# Patient Record
Sex: MALE | Race: WHITE | Employment: UNEMPLOYED | URBAN - METROPOLITAN AREA
[De-identification: names, ages, dates, MRNs, and addresses within clinical notes are randomized per-mention and may not be internally consistent; named-entity substitution may affect disease eponyms.]

---

## 2017-09-07 ENCOUNTER — TELEPHONE (OUTPATIENT)
Dept: OTHER | Age: 52
End: 2017-09-07

## 2017-09-07 NOTE — TELEPHONE ENCOUNTER
Pt requesting a refill of hemorrhoidal suppository once prescribed prior to [facility].   As has a hemorrhoid aggravating him    appt pending 9/18/17

## 2017-09-18 ENCOUNTER — OFFICE VISIT (OUTPATIENT)
Dept: FAMILY MEDICINE CLINIC | Facility: CLINIC | Age: 52
End: 2017-09-18

## 2017-09-18 VITALS
HEART RATE: 81 BPM | DIASTOLIC BLOOD PRESSURE: 92 MMHG | TEMPERATURE: 98 F | BODY MASS INDEX: 25.31 KG/M2 | HEIGHT: 68 IN | WEIGHT: 167 LBS | SYSTOLIC BLOOD PRESSURE: 161 MMHG

## 2017-09-18 DIAGNOSIS — D86.9 SARCOIDOSIS: ICD-10-CM

## 2017-09-18 DIAGNOSIS — Z00.00 ROUTINE GENERAL MEDICAL EXAMINATION AT A HEALTH CARE FACILITY: Primary | ICD-10-CM

## 2017-09-18 DIAGNOSIS — I10 ESSENTIAL HYPERTENSION: ICD-10-CM

## 2017-09-18 PROCEDURE — 99396 PREV VISIT EST AGE 40-64: CPT | Performed by: FAMILY MEDICINE

## 2017-09-18 RX ORDER — BENAZEPRIL HYDROCHLORIDE 10 MG/1
TABLET ORAL
Qty: 90 TABLET | Refills: 3 | Status: SHIPPED | OUTPATIENT
Start: 2017-09-18 | End: 2018-09-25

## 2017-09-18 NOTE — PROGRESS NOTES
HPI:    Patient ID: Yuni Guzmán is a 46year old male. HPI  Patient presents with:  Physical  Medication Request    Review of Systems   Constitutional: Negative. HENT: Negative. Eyes: Negative. Respiratory: Negative.     Cardiovascular: Ne sounds. Pulses:       Carotid pulses are 2+ on the right side, and 2+ on the left side. Radial pulses are 2+ on the right side, and 2+ on the left side. Pulmonary/Chest: Breath sounds normal.   Abdominal: Normal appearance.  There is no hepatospl

## 2017-09-22 ENCOUNTER — LAB ENCOUNTER (OUTPATIENT)
Dept: LAB | Age: 52
End: 2017-09-22
Attending: FAMILY MEDICINE
Payer: COMMERCIAL

## 2017-09-22 DIAGNOSIS — Z00.00 ROUTINE GENERAL MEDICAL EXAMINATION AT A HEALTH CARE FACILITY: ICD-10-CM

## 2017-09-22 LAB
ALBUMIN SERPL BCP-MCNC: 4.3 G/DL (ref 3.5–4.8)
ALBUMIN/GLOB SERPL: 1.6 {RATIO} (ref 1–2)
ALP SERPL-CCNC: 48 U/L (ref 32–100)
ALT SERPL-CCNC: 21 U/L (ref 17–63)
ANION GAP SERPL CALC-SCNC: 7 MMOL/L (ref 0–18)
AST SERPL-CCNC: 21 U/L (ref 15–41)
BASOPHILS # BLD: 0 K/UL (ref 0–0.2)
BASOPHILS NFR BLD: 1 %
BILIRUB SERPL-MCNC: 0.8 MG/DL (ref 0.3–1.2)
BILIRUB UR QL: NEGATIVE
BUN SERPL-MCNC: 11 MG/DL (ref 8–20)
BUN/CREAT SERPL: 12.6 (ref 10–20)
CALCIUM SERPL-MCNC: 9 MG/DL (ref 8.5–10.5)
CHLORIDE SERPL-SCNC: 101 MMOL/L (ref 95–110)
CHOLEST SERPL-MCNC: 184 MG/DL (ref 110–200)
CLARITY UR: CLEAR
CO2 SERPL-SCNC: 29 MMOL/L (ref 22–32)
COLOR UR: YELLOW
CREAT SERPL-MCNC: 0.87 MG/DL (ref 0.5–1.5)
EOSINOPHIL # BLD: 0.1 K/UL (ref 0–0.7)
EOSINOPHIL NFR BLD: 2 %
ERYTHROCYTE [DISTWIDTH] IN BLOOD BY AUTOMATED COUNT: 12.8 % (ref 11–15)
GLOBULIN PLAS-MCNC: 2.7 G/DL (ref 2.5–3.7)
GLUCOSE SERPL-MCNC: 91 MG/DL (ref 70–99)
GLUCOSE UR-MCNC: NEGATIVE MG/DL
HCT VFR BLD AUTO: 40 % (ref 41–52)
HDLC SERPL-MCNC: 70 MG/DL
HGB BLD-MCNC: 14 G/DL (ref 13.5–17.5)
HGB UR QL STRIP.AUTO: NEGATIVE
KETONES UR-MCNC: NEGATIVE MG/DL
LDLC SERPL CALC-MCNC: 104 MG/DL (ref 0–99)
LEUKOCYTE ESTERASE UR QL STRIP.AUTO: NEGATIVE
LYMPHOCYTES # BLD: 0.6 K/UL (ref 1–4)
LYMPHOCYTES NFR BLD: 15 %
MCH RBC QN AUTO: 30.2 PG (ref 27–32)
MCHC RBC AUTO-ENTMCNC: 35 G/DL (ref 32–37)
MCV RBC AUTO: 86.3 FL (ref 80–100)
MONOCYTES # BLD: 0.3 K/UL (ref 0–1)
MONOCYTES NFR BLD: 8 %
NEUTROPHILS # BLD AUTO: 3 K/UL (ref 1.8–7.7)
NEUTROPHILS NFR BLD: 74 %
NITRITE UR QL STRIP.AUTO: NEGATIVE
NONHDLC SERPL-MCNC: 114 MG/DL
OSMOLALITY UR CALC.SUM OF ELEC: 283 MOSM/KG (ref 275–295)
PH UR: 7 [PH] (ref 5–8)
PLATELET # BLD AUTO: 151 K/UL (ref 140–400)
PMV BLD AUTO: 8.3 FL (ref 7.4–10.3)
POTASSIUM SERPL-SCNC: 3.5 MMOL/L (ref 3.3–5.1)
PROT SERPL-MCNC: 7 G/DL (ref 5.9–8.4)
PROT UR-MCNC: NEGATIVE MG/DL
PSA SERPL-MCNC: 0.4 NG/ML (ref 0–4)
RBC # BLD AUTO: 4.64 M/UL (ref 4.5–5.9)
SODIUM SERPL-SCNC: 137 MMOL/L (ref 136–144)
SP GR UR STRIP: 1.01 (ref 1–1.03)
TRIGL SERPL-MCNC: 49 MG/DL (ref 1–149)
UROBILINOGEN UR STRIP-ACNC: <2
VIT C UR-MCNC: NEGATIVE MG/DL
WBC # BLD AUTO: 4 K/UL (ref 4–11)

## 2017-09-22 PROCEDURE — 80053 COMPREHEN METABOLIC PANEL: CPT

## 2017-09-22 PROCEDURE — 81003 URINALYSIS AUTO W/O SCOPE: CPT

## 2017-09-22 PROCEDURE — 36415 COLL VENOUS BLD VENIPUNCTURE: CPT

## 2017-09-22 PROCEDURE — 80061 LIPID PANEL: CPT

## 2017-09-22 PROCEDURE — 85025 COMPLETE CBC W/AUTO DIFF WBC: CPT

## 2018-09-25 ENCOUNTER — OFFICE VISIT (OUTPATIENT)
Dept: FAMILY MEDICINE CLINIC | Facility: CLINIC | Age: 53
End: 2018-09-25
Payer: COMMERCIAL

## 2018-09-25 VITALS
HEART RATE: 81 BPM | DIASTOLIC BLOOD PRESSURE: 98 MMHG | BODY MASS INDEX: 24.86 KG/M2 | SYSTOLIC BLOOD PRESSURE: 148 MMHG | TEMPERATURE: 99 F | WEIGHT: 164 LBS | HEIGHT: 68 IN | RESPIRATION RATE: 20 BRPM

## 2018-09-25 DIAGNOSIS — I10 ESSENTIAL HYPERTENSION: ICD-10-CM

## 2018-09-25 DIAGNOSIS — Z00.00 ROUTINE PHYSICAL EXAMINATION: ICD-10-CM

## 2018-09-25 DIAGNOSIS — Z12.11 COLON CANCER SCREENING: Primary | ICD-10-CM

## 2018-09-25 PROCEDURE — 99396 PREV VISIT EST AGE 40-64: CPT | Performed by: FAMILY MEDICINE

## 2018-09-25 RX ORDER — BENAZEPRIL HYDROCHLORIDE 10 MG/1
TABLET ORAL
Qty: 90 TABLET | Refills: 3 | Status: SHIPPED | OUTPATIENT
Start: 2018-09-25 | End: 2019-01-03

## 2018-09-25 NOTE — PROGRESS NOTES
HPI:    Patient ID: Trang Mary is a 45year old male. Patient is here for routine annual physical exam. No acute issues. No significant chronic medical problems. Patient is requesting testing. Diet and exercise have been fair.  Past medical histor wheezes. He has no rales. Abdominal: Soft. He exhibits no distension. There is no tenderness. There is no rebound. Musculoskeletal: Normal range of motion. He exhibits no edema, tenderness or deformity.    Lymphadenopathy:     He has no cervical adenopa

## 2018-09-28 ENCOUNTER — APPOINTMENT (OUTPATIENT)
Dept: LAB | Age: 53
End: 2018-09-28
Attending: FAMILY MEDICINE
Payer: COMMERCIAL

## 2018-09-28 DIAGNOSIS — I10 ESSENTIAL HYPERTENSION: ICD-10-CM

## 2018-09-28 DIAGNOSIS — Z00.00 ROUTINE PHYSICAL EXAMINATION: ICD-10-CM

## 2018-09-28 PROCEDURE — 36415 COLL VENOUS BLD VENIPUNCTURE: CPT

## 2018-09-28 PROCEDURE — 80053 COMPREHEN METABOLIC PANEL: CPT

## 2018-09-28 PROCEDURE — 80061 LIPID PANEL: CPT

## 2018-09-28 PROCEDURE — 85027 COMPLETE CBC AUTOMATED: CPT

## 2019-01-03 RX ORDER — BENAZEPRIL HYDROCHLORIDE 10 MG/1
TABLET ORAL
Qty: 90 TABLET | Refills: 0 | Status: SHIPPED | OUTPATIENT
Start: 2019-01-03 | End: 2019-01-11

## 2019-01-03 NOTE — TELEPHONE ENCOUNTER
Per p;t, he has a new insurance updated in his chart and so need his rx med send to new pharmacy on file verified.    Benazepril HCl 10 MG Oral Tab      Current Outpatient Medications:  Benazepril HCl 10 MG Oral Tab One tablet daily Disp: 90 tablet Rfl: 3

## 2019-01-11 ENCOUNTER — PATIENT MESSAGE (OUTPATIENT)
Dept: FAMILY MEDICINE CLINIC | Facility: CLINIC | Age: 54
End: 2019-01-11

## 2019-01-11 RX ORDER — BENAZEPRIL HYDROCHLORIDE 10 MG/1
TABLET ORAL
Qty: 90 TABLET | Refills: 3 | Status: SHIPPED | OUTPATIENT
Start: 2019-01-11 | End: 2019-10-25

## 2019-01-11 NOTE — TELEPHONE ENCOUNTER
From: Sue Pappas  To: Melo Barron MD  Sent: 1/11/2019 10:27 AM CST  Subject: Prescription Question    At the start of 2019 our insurance plan changed so I called to have the remaining 3 refills sent to [facility] versus the previous mail order [facility]

## 2019-01-11 NOTE — TELEPHONE ENCOUNTER
Pt has active med order. When location change was requested only 90 day supply sent not the original order.   Rx resent

## 2019-10-25 ENCOUNTER — OFFICE VISIT (OUTPATIENT)
Dept: FAMILY MEDICINE CLINIC | Facility: CLINIC | Age: 54
End: 2019-10-25
Payer: COMMERCIAL

## 2019-10-25 VITALS
SYSTOLIC BLOOD PRESSURE: 160 MMHG | DIASTOLIC BLOOD PRESSURE: 81 MMHG | WEIGHT: 166 LBS | HEIGHT: 68 IN | BODY MASS INDEX: 25.16 KG/M2 | HEART RATE: 91 BPM | TEMPERATURE: 98 F

## 2019-10-25 DIAGNOSIS — M25.561 CHRONIC PAIN OF BOTH KNEES: ICD-10-CM

## 2019-10-25 DIAGNOSIS — M25.562 CHRONIC PAIN OF BOTH KNEES: ICD-10-CM

## 2019-10-25 DIAGNOSIS — G89.29 CHRONIC PAIN OF BOTH KNEES: ICD-10-CM

## 2019-10-25 DIAGNOSIS — D86.9 SARCOIDOSIS: ICD-10-CM

## 2019-10-25 DIAGNOSIS — I10 ESSENTIAL HYPERTENSION: ICD-10-CM

## 2019-10-25 DIAGNOSIS — Z00.00 ROUTINE PHYSICAL EXAMINATION: Primary | ICD-10-CM

## 2019-10-25 PROCEDURE — 99396 PREV VISIT EST AGE 40-64: CPT | Performed by: FAMILY MEDICINE

## 2019-10-25 RX ORDER — BENAZEPRIL HYDROCHLORIDE 10 MG/1
TABLET ORAL
Qty: 90 TABLET | Refills: 3 | Status: SHIPPED | OUTPATIENT
Start: 2019-10-25 | End: 2019-11-04 | Stop reason: CLARIF

## 2019-10-25 NOTE — PROGRESS NOTES
HPI:    Patient ID: Phil Skinner is a 52year old male.     HPI  Patient presents with:  Physical: annual physical, PT DECLINED FLU SHOT     Past Medical History:   Diagnosis Date   • Cholecystitis    • Umbilical pain     adhesions   • Unspecified esse the right side and 2+ on the left side. Radial pulses are 2+ on the right side and 2+ on the left side. Pulmonary/Chest: Breath sounds normal.   Abdominal: Normal appearance. There is no hepatosplenomegaly. There is no tenderness.  There is no CVA t

## 2019-11-01 ENCOUNTER — LAB ENCOUNTER (OUTPATIENT)
Dept: LAB | Age: 54
End: 2019-11-01
Attending: FAMILY MEDICINE
Payer: COMMERCIAL

## 2019-11-01 DIAGNOSIS — Z00.00 ROUTINE PHYSICAL EXAMINATION: ICD-10-CM

## 2019-11-01 PROCEDURE — 85025 COMPLETE CBC W/AUTO DIFF WBC: CPT

## 2019-11-01 PROCEDURE — 36415 COLL VENOUS BLD VENIPUNCTURE: CPT

## 2019-11-01 PROCEDURE — 80061 LIPID PANEL: CPT

## 2019-11-01 PROCEDURE — 80053 COMPREHEN METABOLIC PANEL: CPT

## 2019-11-01 PROCEDURE — 81003 URINALYSIS AUTO W/O SCOPE: CPT

## 2019-11-04 ENCOUNTER — TELEPHONE (OUTPATIENT)
Dept: FAMILY MEDICINE CLINIC | Facility: CLINIC | Age: 54
End: 2019-11-04

## 2019-11-04 RX ORDER — BENAZEPRIL HYDROCHLORIDE 10 MG/1
TABLET ORAL
Qty: 90 TABLET | Refills: 3 | Status: SHIPPED | OUTPATIENT
Start: 2019-11-04 | End: 2020-10-26

## 2019-11-04 NOTE — TELEPHONE ENCOUNTER
Patient states that medication below was sent to the wrong pharmacy. He no longer uses [facility] due to insurance reasons. He is requesting the script be sent to [facility] in [place], on chart. Please advise.      Current Outpatient Medi

## 2019-11-04 NOTE — TELEPHONE ENCOUNTER
Per patient request, contacted [facility] by phone to confirm cancellation of Benazepril. Script re-sent to [facility]. Pt notified of transfer via [address]

## 2020-10-26 ENCOUNTER — OFFICE VISIT (OUTPATIENT)
Dept: FAMILY MEDICINE CLINIC | Facility: CLINIC | Age: 55
End: 2020-10-26
Payer: COMMERCIAL

## 2020-10-26 VITALS
HEART RATE: 81 BPM | WEIGHT: 165 LBS | BODY MASS INDEX: 25.01 KG/M2 | HEIGHT: 68 IN | SYSTOLIC BLOOD PRESSURE: 172 MMHG | DIASTOLIC BLOOD PRESSURE: 90 MMHG

## 2020-10-26 DIAGNOSIS — Z00.00 ROUTINE PHYSICAL EXAMINATION: Primary | ICD-10-CM

## 2020-10-26 DIAGNOSIS — I10 ESSENTIAL HYPERTENSION: ICD-10-CM

## 2020-10-26 DIAGNOSIS — D86.9 SARCOIDOSIS: ICD-10-CM

## 2020-10-26 PROCEDURE — 3008F BODY MASS INDEX DOCD: CPT | Performed by: FAMILY MEDICINE

## 2020-10-26 PROCEDURE — 3077F SYST BP >= 140 MM HG: CPT | Performed by: FAMILY MEDICINE

## 2020-10-26 PROCEDURE — 99396 PREV VISIT EST AGE 40-64: CPT | Performed by: FAMILY MEDICINE

## 2020-10-26 PROCEDURE — 3080F DIAST BP >= 90 MM HG: CPT | Performed by: FAMILY MEDICINE

## 2020-10-26 RX ORDER — BENAZEPRIL HYDROCHLORIDE 10 MG/1
TABLET ORAL
Qty: 90 TABLET | Refills: 3 | Status: SHIPPED | OUTPATIENT
Start: 2020-10-26 | End: 2020-12-10

## 2020-10-26 NOTE — PROGRESS NOTES
HPI:    Patient ID: Regina Lacey is a 62year old male.     HPI  Patient presents with:  Physical: annual physical, pt declined flu shot   Referral: for colonoscopy     Past Medical History:   Diagnosis Date   • Cholecystitis    • Umbilical pain     ad normal heart sounds. Pulses:       Carotid pulses are 2+ on the right side and 2+ on the left side. Radial pulses are 2+ on the right side and 2+ on the left side. Pulmonary/Chest: Breath sounds normal.   Abdominal: Normal appearance.  There is no

## 2020-10-27 ENCOUNTER — LAB ENCOUNTER (OUTPATIENT)
Dept: LAB | Age: 55
End: 2020-10-27
Attending: FAMILY MEDICINE
Payer: COMMERCIAL

## 2020-10-27 DIAGNOSIS — Z00.00 ROUTINE PHYSICAL EXAMINATION: ICD-10-CM

## 2020-10-27 PROCEDURE — 85025 COMPLETE CBC W/AUTO DIFF WBC: CPT

## 2020-10-27 PROCEDURE — 80053 COMPREHEN METABOLIC PANEL: CPT

## 2020-10-27 PROCEDURE — 36415 COLL VENOUS BLD VENIPUNCTURE: CPT

## 2020-10-27 PROCEDURE — 80061 LIPID PANEL: CPT

## 2020-10-27 PROCEDURE — 81003 URINALYSIS AUTO W/O SCOPE: CPT

## 2020-12-10 ENCOUNTER — TELEPHONE (OUTPATIENT)
Dept: FAMILY MEDICINE CLINIC | Facility: CLINIC | Age: 55
End: 2020-12-10

## 2020-12-10 RX ORDER — BENAZEPRIL HYDROCHLORIDE 10 MG/1
TABLET ORAL
Qty: 90 TABLET | Refills: 3 | Status: SHIPPED | OUTPATIENT
Start: 2020-12-10 | End: 2021-12-22

## 2020-12-10 NOTE — TELEPHONE ENCOUNTER
Patient states he would like prescription Benazepril  transferred to [facility] pharmacy in [facility] on [facility]. Prescription sent to [facility] in [facility] called to cancel prescription.

## 2021-12-22 RX ORDER — BENAZEPRIL HYDROCHLORIDE 10 MG/1
TABLET ORAL
Qty: 30 TABLET | Refills: 0 | Status: SHIPPED | OUTPATIENT
Start: 2021-12-22 | End: 2022-01-13

## 2022-01-13 RX ORDER — BENAZEPRIL HYDROCHLORIDE 10 MG/1
TABLET ORAL
Qty: 30 TABLET | Refills: 0 | Status: SHIPPED | OUTPATIENT
Start: 2022-01-13

## 2022-01-13 NOTE — TELEPHONE ENCOUNTER
Please review. Protocol failed or has no protocol.     Requested Prescriptions   Pending Prescriptions Disp Refills    BENAZEPRIL HCL 10 MG Oral Tab [Pharmacy Med Name: BENAZEPRIL HCL 10 MG TABLET] 30 tablet 0     Sig: TAKE 1 TABLET BY MOUTH EVERY DAY        Hypertensive Medications Protocol Failed - 1/13/2022  1:31 PM        Failed - CMP or BMP in past 12 months        Failed - Appointment in past 6 or next 3 months        Passed - GFR Non- > 50     Lab Results   Component Value Date    GFRNAA 81 10/27/2020                       Recent Outpatient Visits              1 year ago Routine physical examination    [facility], [address] [place] [address],     Office Visit    2 years ago Routine physical examination    [facility], [address] [place] [address]    Office Visit    3 years ago Colon cancer screening    Deandra Quiroz MD    Office Visit    4 years ago Routine general medical examination at a health care facility    [facility], [address] [place] [address]    5 years ago Routine general medical examination at a health care facility    [facility], [address] [place] [address]    Office Visit

## 2022-02-17 RX ORDER — BENAZEPRIL HYDROCHLORIDE 10 MG/1
TABLET ORAL
Qty: 30 TABLET | Refills: 0 | OUTPATIENT
Start: 2022-02-17

## 2022-02-17 NOTE — TELEPHONE ENCOUNTER
Patient currently in transition to move to [place]. Still traveling back and forth to [facility] so has not had a chance to schedule an appointment. has not established care in [place] yet either. Currently at the airport traveling to [facility] but his flight was cancelled because of the snow. Patient still has about 30-40pills left. Will call back to schedule an appointment when he is in [facility].

## 2023-01-05 NOTE — TELEPHONE ENCOUNTER
Left message on patient  voice mail that his Rx was approve for on 30 days supply. Pt was informed that he need to schedule appointment for his next refills.
Patient needs to schedule a visit. Last seen 10/2020. 30 day supply given.
Statement Selected

## 2023-04-11 ENCOUNTER — POST-OP (OUTPATIENT)
Dept: URBAN - METROPOLITAN AREA CLINIC 33 | Facility: CLINIC | Age: 58
End: 2023-04-11

## 2023-04-11 DIAGNOSIS — H35.412: ICD-10-CM

## 2023-04-11 DIAGNOSIS — H02.834: ICD-10-CM

## 2023-04-11 DIAGNOSIS — H33.021: ICD-10-CM

## 2023-04-11 DIAGNOSIS — H02.831: ICD-10-CM

## 2023-04-11 DIAGNOSIS — Z98.890: ICD-10-CM

## 2023-04-11 DIAGNOSIS — H43.392: ICD-10-CM

## 2023-04-11 PROCEDURE — 99024 POSTOP FOLLOW-UP VISIT: CPT

## 2023-04-11 ASSESSMENT — TONOMETRY
OS_IOP_MMHG: 14
OD_IOP_MMHG: 16

## 2023-04-11 ASSESSMENT — VISUAL ACUITY
OD_PH: 20/200-1
OS_CC: 20/40-2
OD_SC: 20/400-1

## 2023-05-15 ENCOUNTER — PRE-OP/H&P (OUTPATIENT)
Dept: URBAN - METROPOLITAN AREA CLINIC 33 | Facility: CLINIC | Age: 58
End: 2023-05-15

## 2023-05-15 DIAGNOSIS — H02.831: ICD-10-CM

## 2023-05-15 DIAGNOSIS — H43.392: ICD-10-CM

## 2023-05-15 DIAGNOSIS — H33.021: ICD-10-CM

## 2023-05-15 DIAGNOSIS — H35.412: ICD-10-CM

## 2023-05-15 DIAGNOSIS — H02.834: ICD-10-CM

## 2023-05-15 PROCEDURE — 92250 FUNDUS PHOTOGRAPHY W/I&R: CPT

## 2023-05-15 PROCEDURE — 92014 COMPRE OPH EXAM EST PT 1/>: CPT

## 2023-05-15 RX ORDER — PREDNISOLONE ACETATE 10 MG/ML: 1 SUSPENSION/ DROPS OPHTHALMIC

## 2023-05-15 RX ORDER — TOBRAMYCIN 3 MG/ML: 1 SOLUTION/ DROPS OPHTHALMIC

## 2023-05-15 ASSESSMENT — VISUAL ACUITY
OS_CC: 20/40+2
OD_SC: CF 6FT

## 2023-05-15 ASSESSMENT — TONOMETRY
OD_IOP_MMHG: 10
OS_IOP_MMHG: 14

## 2023-06-05 ENCOUNTER — SURGERY/PROCEDURE (OUTPATIENT)
Facility: LOCATION | Age: 58
End: 2023-06-05

## 2023-06-05 DIAGNOSIS — H33.021: ICD-10-CM

## 2023-06-05 PROCEDURE — 67039 LASER TREATMENT OF RETINA: CPT

## 2023-06-06 ENCOUNTER — POST-OP (OUTPATIENT)
Dept: URBAN - METROPOLITAN AREA CLINIC 33 | Facility: CLINIC | Age: 58
End: 2023-06-06

## 2023-06-06 DIAGNOSIS — H33.021: ICD-10-CM

## 2023-06-06 DIAGNOSIS — H02.831: ICD-10-CM

## 2023-06-06 DIAGNOSIS — H35.412: ICD-10-CM

## 2023-06-06 DIAGNOSIS — H02.834: ICD-10-CM

## 2023-06-06 DIAGNOSIS — H43.392: ICD-10-CM

## 2023-06-06 PROCEDURE — 99024 POSTOP FOLLOW-UP VISIT: CPT

## 2023-06-06 ASSESSMENT — TONOMETRY: OD_IOP_MMHG: 7

## 2023-06-20 ENCOUNTER — POST-OP (OUTPATIENT)
Dept: URBAN - METROPOLITAN AREA CLINIC 33 | Facility: CLINIC | Age: 58
End: 2023-06-20

## 2023-06-20 DIAGNOSIS — H35.412: ICD-10-CM

## 2023-06-20 DIAGNOSIS — H25.041: ICD-10-CM

## 2023-06-20 DIAGNOSIS — H02.834: ICD-10-CM

## 2023-06-20 DIAGNOSIS — H43.392: ICD-10-CM

## 2023-06-20 DIAGNOSIS — H02.831: ICD-10-CM

## 2023-06-20 DIAGNOSIS — H33.021: ICD-10-CM

## 2023-06-20 PROCEDURE — 99024 POSTOP FOLLOW-UP VISIT: CPT

## 2023-06-20 ASSESSMENT — TONOMETRY
OS_IOP_MMHG: 18
OD_IOP_MMHG: 18

## 2023-06-20 ASSESSMENT — VISUAL ACUITY
OD_SC: 20/400
OS_CC: 20/30-2

## 2023-07-11 ENCOUNTER — POST-OP (OUTPATIENT)
Dept: URBAN - METROPOLITAN AREA CLINIC 33 | Facility: CLINIC | Age: 58
End: 2023-07-11

## 2023-07-11 DIAGNOSIS — H02.834: ICD-10-CM

## 2023-07-11 DIAGNOSIS — H02.831: ICD-10-CM

## 2023-07-11 DIAGNOSIS — H35.412: ICD-10-CM

## 2023-07-11 DIAGNOSIS — H33.021: ICD-10-CM

## 2023-07-11 DIAGNOSIS — H25.041: ICD-10-CM

## 2023-07-11 DIAGNOSIS — H43.392: ICD-10-CM

## 2023-07-11 PROCEDURE — 99024 POSTOP FOLLOW-UP VISIT: CPT

## 2023-07-11 ASSESSMENT — TONOMETRY: OD_IOP_MMHG: 17

## 2023-07-11 ASSESSMENT — VISUAL ACUITY: OD_CC: 20/400

## 2023-08-22 ENCOUNTER — POST-OP (OUTPATIENT)
Dept: URBAN - METROPOLITAN AREA CLINIC 33 | Facility: CLINIC | Age: 58
End: 2023-08-22

## 2023-08-22 DIAGNOSIS — H02.834: ICD-10-CM

## 2023-08-22 DIAGNOSIS — H33.021: ICD-10-CM

## 2023-08-22 DIAGNOSIS — H02.831: ICD-10-CM

## 2023-08-22 DIAGNOSIS — H26.491: ICD-10-CM

## 2023-08-22 DIAGNOSIS — H43.392: ICD-10-CM

## 2023-08-22 DIAGNOSIS — H35.412: ICD-10-CM

## 2023-08-22 PROCEDURE — 99024 POSTOP FOLLOW-UP VISIT: CPT

## 2023-08-22 PROCEDURE — 92134 CPTRZ OPH DX IMG PST SGM RTA: CPT

## 2023-08-22 ASSESSMENT — VISUAL ACUITY
OD_SC: 20/80-2
OS_SC: 20/20

## 2023-08-22 ASSESSMENT — TONOMETRY
OD_IOP_MMHG: 14
OS_IOP_MMHG: 16

## 2023-10-11 ENCOUNTER — APPOINTMENT (RX ONLY)
Dept: URBAN - METROPOLITAN AREA CLINIC 124 | Facility: CLINIC | Age: 58
Setting detail: DERMATOLOGY
End: 2023-10-11

## 2023-10-11 DIAGNOSIS — D22 MELANOCYTIC NEVI: ICD-10-CM

## 2023-10-11 DIAGNOSIS — L82.1 OTHER SEBORRHEIC KERATOSIS: ICD-10-CM

## 2023-10-11 DIAGNOSIS — D18.0 HEMANGIOMA: ICD-10-CM

## 2023-10-11 DIAGNOSIS — Z71.89 OTHER SPECIFIED COUNSELING: ICD-10-CM

## 2023-10-11 DIAGNOSIS — L81.4 OTHER MELANIN HYPERPIGMENTATION: ICD-10-CM

## 2023-10-11 PROBLEM — D22.5 MELANOCYTIC NEVI OF TRUNK: Status: ACTIVE | Noted: 2023-10-11

## 2023-10-11 PROBLEM — D18.01 HEMANGIOMA OF SKIN AND SUBCUTANEOUS TISSUE: Status: ACTIVE | Noted: 2023-10-11

## 2023-10-11 PROCEDURE — ? ADDITIONAL NOTES

## 2023-10-11 PROCEDURE — ? COUNSELING

## 2023-10-11 PROCEDURE — 99203 OFFICE O/P NEW LOW 30 MIN: CPT

## 2023-10-11 ASSESSMENT — LOCATION SIMPLE DESCRIPTION DERM
LOCATION SIMPLE: LEFT UPPER BACK
LOCATION SIMPLE: RIGHT THIGH
LOCATION SIMPLE: CHEST
LOCATION SIMPLE: RIGHT LOWER BACK
LOCATION SIMPLE: ABDOMEN
LOCATION SIMPLE: RIGHT CHEEK

## 2023-10-11 ASSESSMENT — LOCATION DETAILED DESCRIPTION DERM
LOCATION DETAILED: MIDDLE STERNUM
LOCATION DETAILED: EPIGASTRIC SKIN
LOCATION DETAILED: RIGHT INFERIOR LATERAL MIDBACK
LOCATION DETAILED: RIGHT SUPERIOR CENTRAL MALAR CHEEK
LOCATION DETAILED: RIGHT ANTERIOR PROXIMAL THIGH
LOCATION DETAILED: LEFT INFERIOR UPPER BACK

## 2023-10-11 ASSESSMENT — LOCATION ZONE DERM
LOCATION ZONE: LEG
LOCATION ZONE: FACE
LOCATION ZONE: TRUNK

## 2023-10-11 NOTE — PROCEDURE: ADDITIONAL NOTES
Additional Notes: Discussed removing SK on right cheek under eye -cosmetic removal. Quoted $150 for up to 15 lesions, $50 for one. Patient verbalized understanding.
Render Risk Assessment In Note?: no
Detail Level: Simple

## 2023-11-01 ENCOUNTER — FOLLOW UP (OUTPATIENT)
Dept: URBAN - METROPOLITAN AREA CLINIC 33 | Facility: CLINIC | Age: 58
End: 2023-11-01

## 2023-11-01 DIAGNOSIS — H33.021: ICD-10-CM

## 2023-11-01 DIAGNOSIS — H43.812: ICD-10-CM

## 2023-11-01 DIAGNOSIS — H35.412: ICD-10-CM

## 2023-11-01 DIAGNOSIS — H02.831: ICD-10-CM

## 2023-11-01 DIAGNOSIS — H02.834: ICD-10-CM

## 2023-11-01 PROCEDURE — 92134 CPTRZ OPH DX IMG PST SGM RTA: CPT

## 2023-11-01 PROCEDURE — 92014 COMPRE OPH EXAM EST PT 1/>: CPT

## 2023-11-01 ASSESSMENT — TONOMETRY
OS_IOP_MMHG: 14
OD_IOP_MMHG: 13

## 2023-11-01 ASSESSMENT — VISUAL ACUITY
OS_SC: 20/20-1
OD_SC: 20/40-1